# Patient Record
Sex: FEMALE
[De-identification: names, ages, dates, MRNs, and addresses within clinical notes are randomized per-mention and may not be internally consistent; named-entity substitution may affect disease eponyms.]

---

## 2020-08-17 PROBLEM — Z00.00 ENCOUNTER FOR PREVENTIVE HEALTH EXAMINATION: Status: ACTIVE | Noted: 2020-08-17

## 2020-08-25 ENCOUNTER — APPOINTMENT (OUTPATIENT)
Dept: ENDOCRINOLOGY | Facility: CLINIC | Age: 60
End: 2020-08-25

## 2020-08-25 ENCOUNTER — RESULT CHARGE (OUTPATIENT)
Age: 60
End: 2020-08-25

## 2020-08-25 ENCOUNTER — APPOINTMENT (OUTPATIENT)
Dept: ENDOCRINOLOGY | Facility: CLINIC | Age: 60
End: 2020-08-25
Payer: COMMERCIAL

## 2020-08-25 VITALS
SYSTOLIC BLOOD PRESSURE: 142 MMHG | DIASTOLIC BLOOD PRESSURE: 96 MMHG | WEIGHT: 162 LBS | HEART RATE: 112 BPM | HEIGHT: 66 IN | BODY MASS INDEX: 26.03 KG/M2

## 2020-08-25 DIAGNOSIS — E11.65 TYPE 2 DIABETES MELLITUS WITH HYPERGLYCEMIA: ICD-10-CM

## 2020-08-25 PROCEDURE — 82962 GLUCOSE BLOOD TEST: CPT

## 2020-08-25 PROCEDURE — 99205 OFFICE O/P NEW HI 60 MIN: CPT | Mod: 25

## 2020-08-25 RX ORDER — METFORMIN HYDROCHLORIDE 1000 MG/1
1000 TABLET, COATED ORAL
Qty: 180 | Refills: 3 | Status: ACTIVE | COMMUNITY
Start: 2020-08-25 | End: 1900-01-01

## 2020-08-25 NOTE — HISTORY OF PRESENT ILLNESS
[FreeTextEntry1] : 61 y/o F pt, /96, BMI 26.15, with Hx of T2DM (dx in 7/2020, previously was pre-DM in 3/2019), referred by Dr. 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 Afterwards she states she was unable to f/u 2/2 the pandemic. \par She states she went back to her PCP and did blood work which showed an A1c > 14%. Pt notes she was rx Metformin aftewards.\par \par Pt states she belongs to another medical group and has an appointment to see a Nurse Educator, RD and Podiatrist at that site. \par  \par 8/25/20\par Today pt presents with POCT 204, feeling well with c/o polydipsia, and weight loss 20lb since 3/2020. Pt notes she has not regained any weight over the past 4 weeks and has been "eating like a rabbit."\par Pt states she is an energetic person and does not have "any symptoms besides being thirsty."\par Denies nocturia, and blurry vision.\par \par Current Mediations: Atorvastatin 10mg QD, Metformin 500mg BID, \par \par Recent Labs:\par - 7/16/20 A1c > 14%, s. creat 0.49, TSH 0.92, Total Cholesterol 233, LDL-c 161,

## 2020-08-25 NOTE — REASON FOR VISIT
[Initial Evaluation] : an initial evaluation [DM Type 2] : DM Type 2 [FreeTextEntry2] : Dr. Gordon Powell

## 2020-08-25 NOTE — END OF VISIT
[Time Spent: ___ minutes] : I have spent [unfilled] minutes of time on the encounter. [FreeTextEntry3] : All medical record entries made by the Scribe were at my, Dr. Onur Larsen, direction and personally dictated by me on 08/25/2020. I have reviewed the chart and agree that the record accurately reflects my personal performance of the history, physical exam, assessment and plan. I have also personally directed, reviewed and agreed with the chart.  [>50% of the face to face encounter time was spent on counseling and/or coordination of care for ___] : Greater than 50% of the face to face encounter time was spent on counseling and/or coordination of care for [unfilled]

## 2020-08-25 NOTE — PHYSICAL EXAM
[Normal Sclera/Conjunctiva] : normal sclera/conjunctiva [Alert] : alert [No Neck Mass] : no neck mass was observed [Normal Outer Ear/Nose] : the ears and nose were normal in appearance [Clear to Auscultation] : lungs were clear to auscultation bilaterally [Normal Rate] : heart rate was normal [Pedal Pulses Normal] : the pedal pulses are present [Normal S1, S2] : normal S1 and S2 [Spine Straight] : spine straight [Normal Bowel Sounds] : normal bowel sounds [Normal Gait] : normal gait [Oriented x3] : oriented to person, place, and time [Normal Reflexes] : deep tendon reflexes were 2+ and symmetric [No Rash] : no rash [Thyroid Not Enlarged] : the thyroid was not enlarged [No Thyroid Nodules] : no palpable thyroid nodules

## 2020-08-25 NOTE — REVIEW OF SYSTEMS
[Recent Weight Loss (___ Lbs)] : recent weight loss: [unfilled] lbs [Polydipsia] : polydipsia [As Noted in HPI] : as noted in HPI [Negative] : Psychiatric [Fatigue] : no fatigue [Blurred Vision] : no blurred vision [Nocturia] : no nocturia

## 2020-08-25 NOTE — ASSESSMENT
[Importance of Diet and Exercise] : importance of diet and exercise to improve glycemic control, achieve weight loss and improve cardiovascular health [Self Monitoring of Blood Glucose] : self monitoring of blood glucose [Exercise/Effect on Glucose] : exercise/effect on glucose [FreeTextEntry1] : 59 y/o F pt with:\par 1. T2DM (dx in ~7/2020 with an A1c > 14%), poorly controlled:\par Pt has high levels of LDL-c and HTN. She stated that she already has an appointment to see the RD and nurse educator at her PCP practice. She brought a Free Style Alex, which RN inserted device subcutaneously. \par Diabetes treatment goals discussed, including target goals for BP, LDL-c, A1c, and body weight. \par Discussed the importance of BS monitoring, along with targets for pre and post prandial BS. \par Briefly summarized anti-diabetic medications, including benefits and side effects with pt. Explained the need to start ACE, which pt will discuss with her PCP. \par Recommend pt increase Metformin to 1g BID, continue Atorvastatin, and initiate Januvia 100 mg QD. \par Will order labs today, including metabolic and lipid profiles. \par \par Return in 3 months.

## 2020-08-25 NOTE — ADDENDUM
[FreeTextEntry1] : I, Talisha Rdz, acted solely as a scribe for Dr. Onur Larsen on this date. 08/25/2020.

## 2020-08-25 NOTE — CONSULT LETTER
[Dear  ___] : Dear  [unfilled], [Consult Letter:] : I had the pleasure of evaluating your patient, [unfilled]. [Please see my note below.] : Please see my note below. [FreeTextEntry3] : Onur Larsen [Sincerely,] : Sincerely,

## 2020-11-23 ENCOUNTER — APPOINTMENT (OUTPATIENT)
Dept: ENDOCRINOLOGY | Facility: CLINIC | Age: 60
End: 2020-11-23
Payer: COMMERCIAL

## 2020-11-23 VITALS
HEART RATE: 89 BPM | SYSTOLIC BLOOD PRESSURE: 140 MMHG | DIASTOLIC BLOOD PRESSURE: 92 MMHG | BODY MASS INDEX: 27.92 KG/M2 | WEIGHT: 173 LBS

## 2020-11-23 LAB — GLUCOSE BLDC GLUCOMTR-MCNC: 105

## 2020-11-23 PROCEDURE — 82962 GLUCOSE BLOOD TEST: CPT

## 2020-11-23 PROCEDURE — 99214 OFFICE O/P EST MOD 30 MIN: CPT | Mod: 25

## 2020-11-23 RX ORDER — SITAGLIPTIN 100 MG/1
100 TABLET, FILM COATED ORAL DAILY
Qty: 30 | Refills: 4 | Status: DISCONTINUED | COMMUNITY
Start: 2020-08-25 | End: 2020-11-23

## 2020-11-23 NOTE — PHYSICAL EXAM
[Alert] : alert [Normal Sclera/Conjunctiva] : normal sclera/conjunctiva [Normal Outer Ear/Nose] : the ears and nose were normal in appearance [No Neck Mass] : no neck mass was observed [Thyroid Not Enlarged] : the thyroid was not enlarged [No Thyroid Nodules] : no palpable thyroid nodules [Clear to Auscultation] : lungs were clear to auscultation bilaterally [Normal S1, S2] : normal S1 and S2 [Normal Rate] : heart rate was normal [Pedal Pulses Normal] : the pedal pulses are present [Normal Bowel Sounds] : normal bowel sounds [Spine Straight] : spine straight [Normal Gait] : normal gait [No Rash] : no rash [Normal Reflexes] : deep tendon reflexes were 2+ and symmetric [Oriented x3] : oriented to person, place, and time

## 2020-11-24 ENCOUNTER — TRANSCRIPTION ENCOUNTER (OUTPATIENT)
Age: 60
End: 2020-11-24

## 2020-11-24 NOTE — ADDENDUM
[FreeTextEntry1] : I, Talisha Rdz, acted solely as a scribe for Dr. Onur Larsen on this date. 11/23/2020.

## 2020-11-24 NOTE — ASSESSMENT
[Importance of Diet and Exercise] : importance of diet and exercise to improve glycemic control, achieve weight loss and improve cardiovascular health [Exercise/Effect on Glucose] : exercise/effect on glucose [Self Monitoring of Blood Glucose] : self monitoring of blood glucose [FreeTextEntry1] : 59 y/o F pt with:\par 1. T2DM (dx in ~7/2020), poorly controlled:\par Pt made a remarkable improvement, normalizing her metabolic profile and A1c of 6.9% (in 11/2020) down from 14%. Diabetes treatment goals discussed, including body weight, BP, cholesterol, and A1c targets.\par Recommend pt continue with Metformin to 1g BID. Will send for urine microalb/creat ratio today. \par \par 2. HTN, /92:\par Will initiate pt on Lisinopril 5mg QD.\par \par 3. HLD:\par Pt had high LDL-c of 161 in 7/2020 and is now 54 in 11/2020. \par Recommend pt continue with Atorvastatin and modified diet. \par \par Return in 6 months.

## 2020-11-24 NOTE — END OF VISIT
[FreeTextEntry3] : All medical record entries made by the Scribe were at my, Dr. Onur Larsen, direction and personally dictated by me on 11/23/2020. I have reviewed the chart and agree that the record accurately reflects my personal performance of the history, physical exam, assessment and plan. I have also personally directed, reviewed and agreed with the chart.  [Time Spent: ___ minutes] : I have spent [unfilled] minutes of time on the encounter. [>50% of the face to face encounter time was spent on counseling and/or coordination of care for ___] : Greater than 50% of the face to face encounter time was spent on counseling and/or coordination of care for [unfilled]

## 2021-01-26 LAB — GLUCOSE BLDC GLUCOMTR-MCNC: 204

## 2021-03-02 ENCOUNTER — RX RENEWAL (OUTPATIENT)
Age: 61
End: 2021-03-02

## 2021-03-02 RX ORDER — LISINOPRIL 5 MG/1
5 TABLET ORAL DAILY
Qty: 90 | Refills: 0 | Status: ACTIVE | COMMUNITY
Start: 2020-11-23 | End: 1900-01-01

## 2021-05-10 ENCOUNTER — APPOINTMENT (OUTPATIENT)
Dept: ENDOCRINOLOGY | Facility: CLINIC | Age: 61
End: 2021-05-10